# Patient Record
Sex: MALE | Race: WHITE | NOT HISPANIC OR LATINO
[De-identification: names, ages, dates, MRNs, and addresses within clinical notes are randomized per-mention and may not be internally consistent; named-entity substitution may affect disease eponyms.]

---

## 2021-06-15 PROBLEM — Z00.00 ENCOUNTER FOR PREVENTIVE HEALTH EXAMINATION: Status: ACTIVE | Noted: 2021-06-15

## 2021-06-16 ENCOUNTER — APPOINTMENT (OUTPATIENT)
Dept: ULTRASOUND IMAGING | Facility: CLINIC | Age: 76
End: 2021-06-16
Payer: MEDICARE

## 2021-06-16 PROCEDURE — 20611 DRAIN/INJ JOINT/BURSA W/US: CPT | Mod: LT

## 2021-09-13 ENCOUNTER — OUTPATIENT (OUTPATIENT)
Dept: OUTPATIENT SERVICES | Facility: HOSPITAL | Age: 76
LOS: 1 days | End: 2021-09-13
Payer: MEDICARE

## 2021-09-13 PROCEDURE — 93010 ELECTROCARDIOGRAM REPORT: CPT

## 2021-09-16 ENCOUNTER — OUTPATIENT (OUTPATIENT)
Dept: OUTPATIENT SERVICES | Facility: HOSPITAL | Age: 76
LOS: 1 days | End: 2021-09-16

## 2022-03-10 ENCOUNTER — APPOINTMENT (OUTPATIENT)
Dept: OPHTHALMOLOGY | Facility: CLINIC | Age: 77
End: 2022-03-10
Payer: MEDICARE

## 2022-03-10 ENCOUNTER — NON-APPOINTMENT (OUTPATIENT)
Age: 77
End: 2022-03-10

## 2022-03-10 PROCEDURE — 92025 CPTRIZED CORNEAL TOPOGRAPHY: CPT

## 2022-03-10 PROCEDURE — 92134 CPTRZ OPH DX IMG PST SGM RTA: CPT

## 2022-03-10 PROCEDURE — 92002 INTRM OPH EXAM NEW PATIENT: CPT

## 2023-07-05 ENCOUNTER — APPOINTMENT (OUTPATIENT)
Dept: CARDIOLOGY | Facility: CLINIC | Age: 78
End: 2023-07-05
Payer: MEDICARE

## 2023-07-05 ENCOUNTER — NON-APPOINTMENT (OUTPATIENT)
Age: 78
End: 2023-07-05

## 2023-07-05 VITALS
WEIGHT: 200 LBS | HEIGHT: 71 IN | OXYGEN SATURATION: 99 % | BODY MASS INDEX: 28 KG/M2 | SYSTOLIC BLOOD PRESSURE: 128 MMHG | HEART RATE: 68 BPM | DIASTOLIC BLOOD PRESSURE: 70 MMHG

## 2023-07-05 VITALS — DIASTOLIC BLOOD PRESSURE: 74 MMHG | SYSTOLIC BLOOD PRESSURE: 134 MMHG

## 2023-07-05 DIAGNOSIS — I10 ESSENTIAL (PRIMARY) HYPERTENSION: ICD-10-CM

## 2023-07-05 DIAGNOSIS — Z78.9 OTHER SPECIFIED HEALTH STATUS: ICD-10-CM

## 2023-07-05 DIAGNOSIS — E78.5 HYPERLIPIDEMIA, UNSPECIFIED: ICD-10-CM

## 2023-07-05 DIAGNOSIS — Z01.810 ENCOUNTER FOR PREPROCEDURAL CARDIOVASCULAR EXAMINATION: ICD-10-CM

## 2023-07-05 PROCEDURE — 93000 ELECTROCARDIOGRAM COMPLETE: CPT | Mod: NC

## 2023-07-05 PROCEDURE — 99204 OFFICE O/P NEW MOD 45 MIN: CPT

## 2023-07-05 RX ORDER — AMLODIPINE BESYLATE AND BENAZEPRIL HYDROCHLORIDE 5; 20 MG/1; MG/1
5-20 CAPSULE ORAL
Qty: 90 | Refills: 0 | Status: ACTIVE | COMMUNITY

## 2023-07-05 RX ORDER — ATORVASTATIN CALCIUM 20 MG/1
20 TABLET, FILM COATED ORAL
Qty: 90 | Refills: 0 | Status: ACTIVE | COMMUNITY

## 2023-07-05 NOTE — PHYSICAL EXAM
[Normal] : moves all extremities, no focal deficits, normal speech [de-identified] : No carotid bruits auscultated bilaterally

## 2023-07-05 NOTE — HISTORY OF PRESENT ILLNESS
[FreeTextEntry1] : 78 year old male with PMHx of HTN, HLD presents for a cardiac evaluation prior to right shoulder surgery at [facility] [place] on 7/26/2023. Patient with no exertional chest pain, dyspnea or palpitations. Compliant with medical therapy and reports no adverse effects.\par \par There is no history of MI, CVA, CHF, or previous coronary intervention.\par

## 2023-07-05 NOTE — DISCUSSION/SUMMARY
[FreeTextEntry1] : 1. HTN: Goal BP less than 130/80. Recommend low salt diet. Continue amlodipine-benazepril 5/20mg daily.\par \par 2. HLD: continue atorvastatin 20mg daily.\par \par The patient has greater than 4 METs activity level without exertional complaints. There are no acute ECG changes, no murmur of aortic stenosis, and no clinical signs of heart failure. Patient is optimized from a cardiology standpoint to undergo the planned surgical procedure.\par \par Follow up in 1 year. [EKG obtained to assist in diagnosis and management of assessed problem(s)] : EKG obtained to assist in diagnosis and management of assessed problem(s)

## 2023-07-07 ENCOUNTER — APPOINTMENT (OUTPATIENT)
Dept: MRI IMAGING | Facility: CLINIC | Age: 78
End: 2023-07-07
Payer: MEDICARE

## 2023-07-07 ENCOUNTER — APPOINTMENT (OUTPATIENT)
Dept: CT IMAGING | Facility: CLINIC | Age: 78
End: 2023-07-07
Payer: MEDICARE

## 2023-07-07 PROCEDURE — 73200 CT UPPER EXTREMITY W/O DYE: CPT | Mod: RT,MH

## 2023-07-07 PROCEDURE — 73221 MRI JOINT UPR EXTREM W/O DYE: CPT | Mod: RT,MH

## 2024-12-25 PROBLEM — F10.90 ALCOHOL USE: Status: ACTIVE | Noted: 2023-07-05

## 2025-03-24 ENCOUNTER — APPOINTMENT (OUTPATIENT)
Dept: NEUROSURGERY | Facility: CLINIC | Age: 80
End: 2025-03-24
Payer: MEDICARE

## 2025-03-24 VITALS
HEART RATE: 78 BPM | DIASTOLIC BLOOD PRESSURE: 70 MMHG | HEIGHT: 71 IN | OXYGEN SATURATION: 99 % | WEIGHT: 200 LBS | SYSTOLIC BLOOD PRESSURE: 135 MMHG | BODY MASS INDEX: 28 KG/M2

## 2025-03-24 DIAGNOSIS — M51.369: ICD-10-CM

## 2025-03-24 DIAGNOSIS — M48.061 SPINAL STENOSIS, LUMBAR REGION WITHOUT NEUROGENIC CLAUDICATION: ICD-10-CM

## 2025-03-24 DIAGNOSIS — M47.816 SPONDYLOSIS W/OUT MYELOPATHY OR RADICULOPATHY, LUMBAR REGION: ICD-10-CM

## 2025-03-24 PROCEDURE — 99203 OFFICE O/P NEW LOW 30 MIN: CPT

## 2025-04-22 ENCOUNTER — APPOINTMENT (OUTPATIENT)
Dept: NEUROSURGERY | Facility: CLINIC | Age: 80
End: 2025-04-22
Payer: MEDICARE

## 2025-04-22 DIAGNOSIS — M48.061 SPINAL STENOSIS, LUMBAR REGION WITHOUT NEUROGENIC CLAUDICATION: ICD-10-CM

## 2025-04-22 DIAGNOSIS — M47.816 SPONDYLOSIS W/OUT MYELOPATHY OR RADICULOPATHY, LUMBAR REGION: ICD-10-CM

## 2025-04-22 PROCEDURE — 99204 OFFICE O/P NEW MOD 45 MIN: CPT

## 2025-04-22 RX ORDER — CELECOXIB 200 MG/1
200 CAPSULE ORAL
Qty: 30 | Refills: 0 | Status: ACTIVE | COMMUNITY
Start: 2025-04-22 | End: 1900-01-01